# Patient Record
(demographics unavailable — no encounter records)

---

## 2024-12-09 NOTE — US
EXAMINATION TYPE: Transabdominal

 

DATE OF EXAM: 12/9/2024 8:01 AM

 

COMPARISON: NONE

 

CLINICAL INDICATION: Female, 43 years old with history of 5 wks, pelvic, rt flank pain;

 

TECHNIQUE: with grayscale and color Doppler imaging including first trimester pregnancy.

 

FINDINGS:

 

EXAM MEASUREMENTS:

 

GESTATIONAL AGE / DATING

 

Physician Established: (4 weeks/6 days) 

** EDC:  08/12/2025

Dates by LMP: (4 weeks/6 days)  

** EDC: 08/12/2025

Dates by First Scan:  No previous this is first scan ( weeks/ days)  

** EDC:

Dates by Current Scan for: (5 weeks/2 days)  

** EDC: 08/09/2025

 

MATERNAL ANATOMY 

 

Uterus: ; Heterogenous area seen right mid uterus = 6.7 x 4.9 x 5.6 cm

Right Ovary: 2.5 x 2.5 x 2.5 cm 

Left Ovary: Unable to insonate

Post CDS / Adnexa: WNL

Presence of free fluid: No

Presence of corpus luteal cyst: Right Ovary

Presence of subchorionic bleed: Yes

 

GESTATION / FETAL SURVEY

CRL: Too early to detect ( weeks/ days)

Gestational Sac morphology: Normal

Gestational Sac MSD: 0.65 (5 weeks/2 days)

Yolk Sac (normal less than 6mm): 1.2 mm

Heart Rate: Unable to detect  bpm

Rhythm:  Unable to assess

IUP:  Yolk Sac seen within the gestational sac

Nuchal Translucency 10-14wks (normal less than 3mm): NA

Age Appropriate Anatomy

Cord Insertion: NA

Fetal Limbs: NA

Calvarium: NA

 

Date of LMP: 11/05/2024

Beta HcG (if available):

 

? Cervical motion tenderness - patient not able to tolerate exam well

 

Patient stated bladder was full but unable to void - not able to visualize bladder 

 

IMPRESSION:

1. Intrauterine gestational sac with yolk sac identified corresponding to ultrasound age of 5 weeks 2
 days. No fetal pole or fetal heart tones are identified at this time likely due to early gestational
 age. Recommend follow-up with pelvic ultrasound and serial beta hCG to ensure further development of
 the fetus.

 

 

 

 

 

 

[facility], Workstation: [id number], 12/9/2024 8:22 AM

## 2024-12-09 NOTE — ED
General Adult HPI





- General


Chief complaint: Nausea/Vomiting/Diarrhea


Stated complaint: NVD, Fever, 4 weeks preg


Time Seen by Provider: 12/09/24 06:56


Source: patient, RN notes reviewed


Mode of arrival: ambulatory


Limitations: no limitations





- History of Present Illness


Initial comments: 





43-year-old G1, P0 female presents to the emergency department for evaluation of

abdominal pain, nausea, vomiting.  Patient reports that his symptoms started 

early this morning.  Patient reports pain in the right side of her abdomen into 

her groin.  She also reports pain in her right flank.  She admits to nausea, 

vomiting, chills.  No documented fever.  She has not taken anything for her 

pain.  Patient reports that her last menstrual period was November 5 and she 

recently had a positive pregnancy test.  She follows with Dr. Muñiz for 

gynecology.





- Related Data


                                  Previous Rx's











 Medication  Instructions  Recorded


 


Cephalexin [Keflex] 500 mg PO Q6HR #28 cap 04/04/22


 


Cephalexin [Keflex] 500 mg PO Q6HR #40 cap 12/09/24


 


Doxylamine Succinate [Unisom] 25 mg PO DAILY #14 tab 12/09/24


 


Metoclopramide HCl [Reglan] 5 mg PO TID PRN #15 tablet 12/09/24











                                    Allergies











Allergy/AdvReac Type Severity Reaction Status Date / Time


 


No Known Allergies Allergy   Verified 12/09/24 06:51














Review of Systems


ROS Statement: 


Those systems with pertinent positive or pertinent negative responses have been 

documented in the HPI.





ROS Other: All systems not noted in ROS Statement are negative.





Past Medical History


Past Medical History: No Reported History


History of Any Multi-Drug Resistant Organisms: MRSA


Date of last positivie culture/infection: 10/26/21


MDRO Source:: MRSA EAR


Past Surgical History: No Surgical Hx Reported


Past Psychological History: No Psychological Hx Reported


Smoking Status: Current every day smoker


Past Alcohol Use History: Rare


Past Drug Use History: None Reported





General Exam


Limitations: no limitations


General appearance: alert, in no apparent distress


Head exam: Present: atraumatic, normocephalic, normal inspection


Eye exam: Present: normal appearance, PERRL, EOMI.  Absent: scleral icterus, 

conjunctival injection, periorbital swelling


ENT exam: Present: normal exam, mucous membranes moist


Respiratory exam: Present: normal lung sounds bilaterally.  Absent: respiratory 

distress, wheezes, rales, rhonchi, stridor


Cardiovascular Exam: Present: regular rate, normal rhythm, normal heart sounds. 

 Absent: systolic murmur, diastolic murmur, rubs, gallop, clicks


GI/Abdominal exam: Present: soft, tenderness (Right lower abdomen), normal bowel

 sounds.  Absent: distended, guarding, rebound, rigid


Extremities exam: Present: normal inspection, full ROM, normal capillary refill.

  Absent: tenderness, pedal edema, joint swelling, calf tenderness


Back exam: Present: CVA tenderness (R)


Neurological exam: Present: alert, oriented X3


Psychiatric exam: Present: normal affect, normal mood


Skin exam: Present: warm, dry, intact, normal color.  Absent: rash





Course


                                   Vital Signs











  12/09/24





  06:49


 


Temperature 97.5 F L


 


Pulse Rate 92


 


Respiratory 18





Rate 


 


Blood Pressure 142/76


 


O2 Sat by Pulse 100





Oximetry 














Medical Decision Making





- Medical Decision Making





Was pt. sent in by a medical professional or institution (, PA, NP, urgent 

care, hospital, or nursing home...) When possible be specific


@  -[No]


Did you speak to anyone other than the patient for history (EMS, parent, family,

 police, friend...)? What history was obtained from this source 


@  -[Patient's  provided some history of this patient]


Did you review nursing and triage notes (agree or disagree)?  Why? 


@  -[I reviewed and agree with nursing and triage notes]


Were old charts reviewed (outside hosp., previous admission, EMS record, old EK

G, old radiological studies, urgent care reports/EKG's, nursing home records)? 

Report findings 


@  -[No old charts were reviewed]


Differential Diagnosis (chest pain, altered mental status, abdominal pain women,

abdominal pain men, vaginal bleeding, weakness, fever, dyspnea, syncope, 

headache, dizziness, GI bleed, back pain, seizure, CVA, palpatations, mental 

health, musculoskeletal)? 


@  -[Differential Abdominal Pain Women:


Appendicitis, Cholecystitis, diverticulosis, ischemic bowel, pancreatitis, 

hepatitis, UTI, gastroenteritis, AAA, incarcerated hernia, bowel obstruction, 

constipation, inflammatory bowel, hepatitis, peptic ulcer disease, splenic 

infarction, perforated viscus, vulvitis, ovarian torsion, PID, kidney stone, 

placenta abruption, this is not meant to be an all-inclusive list


]


EKG interpreted by me (3pts min.).


@  -None


X-rays interpreted by me (1pt min.).


@  -[None done]


CT interpreted by me (1pt min.).


@  -[None done]


U/S interpreted by me (1pt. min.).


@  -[None done]


What testing was considered but not performed or refused? (CT, X-rays, U/S, 

labs)? Why?


@  -[None]


What meds were considered but not given or refused? Why?


@  -[None]


Did you discuss the management of the patient with other professionals 

(professionals i.e. , PA, NP, lab, RT, psych nurse, , , 

teacher, , )? Give summary


@  -[No]


Was smoking cessation discussed for >3mins.?


@  -[No]


Was critical care preformed (if so, how long)?


@  -[No]


Were there social determinants of health that impacted care today? How? 

(Homelessness, low income, unemployed, alcoholism, drug addiction, 

transportation, low edu. Level, literacy, decrease access to med. care, assisted, 

rehab)?


@  -[No]


Was there de-escalation of care discussed even if they declined (Discuss DNR or 

withdrawal of care, Hospice)? DNR status


@  -[No]


What co-morbidities impacted this encounter? (DM, HTN, Smoking, COPD, CAD, 

Cancer, CVA, ARF, Chemo, Hep., AIDS, mental health diagnosis, sleep apnea, 

morbid obesity)?


@  -[None]


Was patient admitted / discharged? Hospital course, mention meds given and 

route, prescriptions, significant lab abnormalities, going to OR and other 

pertinent info.


@  -[hospital course] 


Undiagnosed new problem with uncertain prognosis?


@  -[No]


Drug Therapy requiring intensive monitoring for toxicity (Heparin, Nitro, 

Insulin, Cardizem)?


@  -[No]


Were any procedures done?


@  -[No]


Diagnosis/symptom?


@  -[default]


Acute, or Chronic, or Acute on Chronic?


@  -[default]


Uncomplicated (without systemic symptoms) or Complicated (systemic symptoms)?


@  -[default]


Side effects of treatment?


@  -[No]


Exacerbation, Progression, or Severe Exacerbation?


@  -[No]


Poses a threat to life or bodily function? How? (Chest pain, USA, MI, pneumonia,

 PE, COPD, DKA, ARF, appy, cholecystitis, CVA, Diverticulitis, Homicidal, 

Suicidal, threat to staff... and all critical care pts)


@  -[No]








- Lab Data


Result diagrams: 


                                 12/09/24 08:08





                                 12/09/24 08:08


                                   Lab Results











  12/09/24 12/09/24 12/09/24 Range/Units





  08:08 08:08 11:16 


 


WBC  18.7 H    (3.8-10.6)  k/uL


 


RBC  4.54    (3.80-5.40)  m/uL


 


Hgb  12.3    (11.4-16.0)  gm/dL


 


Hct  37.4    (34.0-46.0)  %


 


MCV  82.4    (80.0-100.0)  fL


 


MCH  27.2    (25.0-35.0)  pg


 


MCHC  33.0    (31.0-37.0)  g/dL


 


RDW  16.9 H    (11.5-15.5)  %


 


Plt Count  360    (150-450)  k/uL


 


MPV  8.0    


 


Neutrophils %  91    %


 


Lymphocytes %  6    %


 


Monocytes %  2    %


 


Eosinophils %  1    %


 


Basophils %  0    %


 


Neutrophils #  17.1 H    (1.3-7.7)  k/uL


 


Lymphocytes #  1.1    (1.0-4.8)  k/uL


 


Monocytes #  0.3    (0-1.0)  k/uL


 


Eosinophils #  0.1    (0-0.7)  k/uL


 


Basophils #  0.0    (0-0.2)  k/uL


 


Anisocytosis  Slight    


 


Sodium   136 L   (137-145)  mmol/L


 


Potassium   4.3   (3.5-5.1)  mmol/L


 


Chloride   106   ()  mmol/L


 


Carbon Dioxide   21 L   (22-30)  mmol/L


 


Anion Gap   9   mmol/L


 


BUN   9   (7-17)  mg/dL


 


Creatinine   0.66   (0.52-1.04)  mg/dL


 


Est GFR (CKD-EPI)AfAm   >90   (>60 ml/min/1.73 sqM)  


 


Est GFR (CKD-EPI)NonAf   >90   (>60 ml/min/1.73 sqM)  


 


Glucose   141 H   (74-99)  mg/dL


 


Calcium   9.3   (8.4-10.2)  mg/dL


 


Total Bilirubin   0.8   (0.2-1.3)  mg/dL


 


AST   27   (14-36)  U/L


 


ALT   28   (4-34)  U/L


 


Alkaline Phosphatase   103   ()  U/L


 


Total Protein   7.1   (6.3-8.2)  g/dL


 


Albumin   4.4   (3.5-5.0)  g/dL


 


Lipase   65   ()  U/L


 


HCG, Quant   5250.4   mIU/mL


 


Urine Color    Colorless  


 


Urine Appearance    Clear  (Clear)  


 


Urine pH    5.5  (5.0-8.0)  


 


Ur Specific Gravity    1.020  (1.001-1.035)  


 


Urine Protein    Negative  (Negative)  


 


Urine Glucose (UA)    Negative  (Negative)  


 


Urine Ketones    3+ H  (Negative)  


 


Urine Blood    Small H  (Negative)  


 


Urine Nitrite    Negative  (Negative)  


 


Urine Bilirubin    Negative  (Negative)  


 


Urine Urobilinogen    <2.0  (<2.0)  mg/dL


 


Ur Leukocyte Esterase    Negative  (Negative)  


 


Urine RBC    7 H  (0-5)  /hpf


 


Urine WBC    1  (0-5)  /hpf


 


Ur Squamous Epith Cells    3  (0-4)  /hpf


 


Urine Bacteria    Occasional H  (None)  /hpf


 


Hyaline Casts    1  (0-2)  /lpf


 


Urine Mucus    Few H  (None)  /hpf














Disposition


Clinical Impression: 


 Abdominal pain





Disposition: HOME SELF-CARE


Condition: Stable


Instructions (If sedation given, give patient instructions):  Abdominal Pain in 

Pregnancy (ED)


Additional Instructions: 


Please follow up with OB/GYN. Return to the emergency department for new or 

worsening symptoms. 


Prescriptions: 


Cephalexin [Keflex] 500 mg PO Q6HR #40 cap


Metoclopramide HCl [Reglan] 5 mg PO TID PRN #15 tablet


 PRN Reason: Nausea


Doxylamine Succinate [Unisom] 25 mg PO DAILY #14 tab


Is patient prescribed a controlled substance at d/c from ED?: No


Referrals: 


Italo Ritchie MD [Primary Care Provider] - 1-2 days

## 2024-12-09 NOTE — US
EXAMINATION TYPE: US abdomen limited

 

DATE OF EXAM: 12/9/2024

 

COMPARISON: NONE

 

CLINICAL INDICATION: Female, 43 years old with history of 5 wks, pelvic, rt flank pain; Nausea, vomit
ing, and diarrhea started this am

 

TECHNIQUE: Grayscale and color Doppler imaging of the right upper quadrant was performed.

 

FINDINGS:

 

EXAM MEASUREMENTS:

 

Liver Length:  13.4 cm   

Gallbladder Wall:  0.3 cm   

CBD:  0.4 cm

Right Kidney:  11.4 x 5.6 x 5.1 cm

 

SONOGRAPHER NOTES:

 

Pancreas:  wnl

Liver:  wnl  

Gallbladder:  Mobile echogenic foci seen 

**Evidence for sonographic Blevins's sign:  No

CBD:  wnl 

Right Kidney:  Prominent renal pelvis  

 

IMPRESSION: 

1.  No evidence for acute process.

2.  Cholelithiasis.

 

[facility], Workstation: [id number], 12/9/2024 8:21 AM 25

## 2025-02-21 NOTE — P.HPOB
History of Present Illness


H&P Date: 25


Chief Complaint: incomplete 





 43 year old  presents for suction D&C.  She had a 14-week loss and has 

been bleeding on and off ever since.  Her beta hCG is going down but she 

continues to have heavy bleeding with clots and her hemoglobin keeps dropping.  

We will take her for a suction D&C today





Review of Systems


All systems: negative


Constitutional: Denies chills, Denies fever


Eyes: denies blurred vision, denies pain


Ears, nose, mouth and throat: Denies headache, Denies sore throat


Cardiovascular: Denies chest pain, Denies shortness of breath


Respiratory: Denies cough


Gastrointestinal: Denies abdominal pain, Denies diarrhea, Denies nausea, Denies 

vomiting


Genitourinary: Denies dysuria, Denies hematuria


Musculoskeletal: Denies myalgias


Integumentary: Denies pruritus, Denies rash


Neurological: Denies numbness, Denies weakness


Psychiatric: Denies anxiety, Denies depression


Endocrine: Denies fatigue, Denies weight change





Past Medical History


Past Medical History: No Reported History


Additional Past Medical History / Comment(s): miscarriage 2025.  Gallstones


History of Any Multi-Drug Resistant Organisms: MRSA


Date of last positivie culture/infection: 10/26/21


MDRO Source:: MRSA EAR


Past Surgical History: No Surgical Hx Reported


Additional Past Surgical History / Comment(s): Wisom Teeth


Past Anesthesia/Blood Transfusion Reactions: No Reported Reaction


Past Psychological History: No Psychological Hx Reported


Smoking Status: Current every day smoker


Past Alcohol Use History: Rare


Past Drug Use History: None Reported





- Past Family History


  ** Father


Family Medical History: Hypertension





Medications and Allergies


                                Home Medications











 Medication  Instructions  Recorded  Confirmed  Type


 


Calcium Carbonate [Tums] 500 mg PO ONETIME PRN 02/10/25 02/21/25 History


 


Ibuprofen [Advil] 600 mg PO ONETIME PRN 02/10/25 02/21/25 History








                                    Allergies











Allergy/AdvReac Type Severity Reaction Status Date / Time


 


No Known Allergies Allergy   Verified 25 12:24














Exam


Osteopathic Statement: *.  No significant issues noted on an osteopathic 

structural exam other than those noted in the History and Physical/Consult.


                                   Vital Signs











  Temp Pulse Resp BP Pulse Ox


 


 25 12:26  97.8 F  108 H  20  135/75  100








                                Intake and Output











 25





 22:59 06:59 14:59


 


Intake Total   1000


 


Balance   1000


 


Intake:   


 


  IV   1000


 


Other:   


 


  Weight   83.4 kg














Heart: Regular rate and rhythm


Lungs: Clear to auscultation bilaterally


Abdomen: Soft, nontender


Extremities: Negative Homans sign





Assessment and Plan


(1) Incomplete 


Current Visit: No   Status: Acute   Code(s): O03.4 - INCOMPLETE SPONTANEOUS 

 WITHOUT COMPLICATION   SNOMED Code(s): 036119133


   





(2) Acute blood loss anemia


Current Visit: Yes   Status: Acute   Code(s): D62 - ACUTE POSTHEMORRHAGIC ANEMIA

   SNOMED Code(s): 045421652


   


Plan: 





1.  Suction D&C

## 2025-02-21 NOTE — P.OP
Date of Procedure: 25


Preoperative Diagnosis: 


1. incomplete 


Postoperative Diagnosis: 


1. incomplete 


Procedure(s) Performed: 


Suction D&C


Anesthesia: KEV


Surgeon: Sylvie Muñiz


Estimated Blood Loss (ml): 75


IV fluids (ml): 1,000


Urine output (ml): 10


Pathology: other (Uterine contents)


Condition: stable


Disposition: PACU


Operative Findings: 


Uterus sounded to 10 cm.  Moderate amount of tissue and blood noted.  Enlarged 

fibroid uterus


Description of Procedure: 


Patient was taken the operating room where general anesthesia was obtained 

without difficulty.  She is prepped draped normal sterile fashion dorsolithotomy

position, legs placed in Jonh stirrups.  Bladder was drained of all urine.  

Weighted speculum placed in the vagina the anterior lip of the cervix was 

grasped with a single-tooth tenaculum.  The cervix dilated to the #10 Hegar 

dilator.  #10 curved suction curette was introduced into the uterus and passed 

several times to remove tissue and blood.  Sharp curette was gently used to 

ensure that all tissue had been removed and the suction curette was introduced a

few more times to remove the extra blood.  All instruments removed from the 

vagina.  Patient tolerated procedure well.  Sponge and instrument counts correct

x 2.  She was taken to recovery in stable condition.